# Patient Record
Sex: MALE | Race: BLACK OR AFRICAN AMERICAN | URBAN - METROPOLITAN AREA
[De-identification: names, ages, dates, MRNs, and addresses within clinical notes are randomized per-mention and may not be internally consistent; named-entity substitution may affect disease eponyms.]

---

## 2018-07-30 ENCOUNTER — OFFICE VISIT (OUTPATIENT)
Dept: URGENT CARE | Facility: URGENT CARE | Age: 33
End: 2018-07-30
Payer: OTHER MISCELLANEOUS

## 2018-07-30 ENCOUNTER — RADIANT APPOINTMENT (OUTPATIENT)
Dept: GENERAL RADIOLOGY | Facility: CLINIC | Age: 33
End: 2018-07-30
Attending: PHYSICIAN ASSISTANT
Payer: OTHER MISCELLANEOUS

## 2018-07-30 VITALS
TEMPERATURE: 98.7 F | HEART RATE: 66 BPM | WEIGHT: 210.5 LBS | DIASTOLIC BLOOD PRESSURE: 78 MMHG | SYSTOLIC BLOOD PRESSURE: 116 MMHG | OXYGEN SATURATION: 98 %

## 2018-07-30 DIAGNOSIS — S51.832A PUNCTURE WOUND OF LEFT FOREARM, INITIAL ENCOUNTER: Primary | ICD-10-CM

## 2018-07-30 PROBLEM — Z72.0 TOBACCO USE: Status: ACTIVE | Noted: 2018-06-16

## 2018-07-30 PROCEDURE — 99203 OFFICE O/P NEW LOW 30 MIN: CPT | Mod: 25 | Performed by: PHYSICIAN ASSISTANT

## 2018-07-30 PROCEDURE — 90715 TDAP VACCINE 7 YRS/> IM: CPT | Performed by: PHYSICIAN ASSISTANT

## 2018-07-30 PROCEDURE — 90471 IMMUNIZATION ADMIN: CPT | Performed by: PHYSICIAN ASSISTANT

## 2018-07-30 PROCEDURE — 73090 X-RAY EXAM OF FOREARM: CPT | Mod: LT

## 2018-07-30 ASSESSMENT — ENCOUNTER SYMPTOMS
MUSCULOSKELETAL NEGATIVE: 1
CARDIOVASCULAR NEGATIVE: 1
WEAKNESS: 0
COUGH: 0
CHEST TIGHTNESS: 0
HEMATURIA: 0
GASTROINTESTINAL NEGATIVE: 1
CHILLS: 0
WHEEZING: 0
RESPIRATORY NEGATIVE: 1
RHINORRHEA: 0
ENDOCRINE NEGATIVE: 1
EYE REDNESS: 0
FREQUENCY: 0
DIARRHEA: 0
CONSTITUTIONAL NEGATIVE: 1
NEUROLOGICAL NEGATIVE: 1
SORE THROAT: 0
EYES NEGATIVE: 1
LIGHT-HEADEDNESS: 0
MYALGIAS: 0
VOMITING: 0
POLYDIPSIA: 0
PALPITATIONS: 0
EYE DISCHARGE: 0
ADENOPATHY: 0
EYE ITCHING: 0
WOUND: 1
NAUSEA: 0
DIAPHORESIS: 0
HEADACHES: 0
ABDOMINAL PAIN: 0
DYSURIA: 0
FEVER: 0
DIZZINESS: 0
SHORTNESS OF BREATH: 0

## 2018-07-30 ASSESSMENT — PAIN SCALES - GENERAL: PAINLEVEL: NO PAIN (0)

## 2018-07-30 NOTE — MR AVS SNAPSHOT
"              After Visit Summary   2018    Temo Wilcox    MRN: [id number]           Patient Information     Date Of Birth          1985        Visit Information        Provider Department      2018 6:00 PM Karl Armstrong PA-C [facility]        Today's Diagnoses     Puncture wound of left forearm, initial encounter    -  1       Follow-ups after your visit        Who to contact     If you have questions or need follow up information about today's clinic visit or your schedule please contact [facility] directly at [phone].  Normal or non-critical lab and imaging results will be communicated to you by RatingBughart, letter or phone within 4 business days after the clinic has received the results. If you do not hear from us within 7 days, please contact the clinic through RatingBughart or phone. If you have a critical or abnormal lab result, we will notify you by phone as soon as possible.  Submit refill requests through BonzerDarg or call your pharmacy and they will forward the refill request to us. Please allow 3 business days for your refill to be completed.          Additional Information About Your Visit        MyChart Information     Bon[employer]Darg lets you send messages to your doctor, view your test results, renew your prescriptions, schedule appointments and more. To sign up, go to [email or web address] . Click on \"Log in\" on the left side of the screen, which will take you to the Welcome page. Then click on \"Sign up Now\" on the right side of the page.     You will be asked to enter the access code listed below, as well as some personal information. Please follow the directions to create your username and password.     Your access code is: [email or web address]  Expires: 10/28/2018  9:14 PM     Your access code will  in 90 days. If you need help or a new code, please call your [facility] or [phone].        Care EveryWhere ID     This is your Care " EveryWhere ID. This could be used by other organizations to access your [place] medical records  [id number]        Your Vitals Were     Pulse Temperature Pulse Oximetry             66 98.7  F (37.1  C) (Oral) 98%          Blood Pressure from Last 3 Encounters:   07/30/18 116/78    Weight from Last 3 Encounters:   07/30/18 210 lb 8 oz (95.5 kg)              We Performed the Following     TDAP, IM (10 - 64 YRS) - Adacel     XR Forearm Left 2 Views        Primary Care Provider Fax #    Physician No Ref-Primary [phone]       No address on file        Equal Access to Services     [facility] : Hadii aad ku hadasho Soomaali, waaxda luqadaha, qaybta kaalmada adejoseyajamel, henry calero . So [facility] [phone].    ATENCIÓN: Si habla español, tiene a powell disposición servicios gratuitos de asistencia lingüística. Llame al [phone].    We comply with applicable federal civil rights laws and [place] laws. We do not discriminate on the basis of race, color, national origin, age, disability, sex, sexual orientation, or gender identity.            Thank you!     Thank you for choosing [facility]  for your care. Our goal is always to provide you with excellent care. Hearing back from our patients is one way we can continue to improve our services. Please take a few minutes to complete the written survey that you may receive in the mail after your visit with us. Thank you!             Your Updated Medication List - Protect others around you: Learn how to safely use, store and throw away your medicines at www.disposemymeds.org.      Notice  As of 7/30/2018  9:14 PM    You have not been prescribed any medications.

## 2018-07-30 NOTE — LETTER
[facility]  [address]  [place] [address]  Phone: [phone]    July 30, 2018        Temo Wilcox  [address]  [place] [address]          To whom it may concern:    RE: Temo Wilcox    Patient was seen and treated today at our clinic and missed work.  Patient may return to work 7/31/2018 with the following:  No working or lifting restrictions    Please contact me for questions or concerns.      Sincerely,        Karl Armstrong PA-C

## 2018-07-31 NOTE — PROGRESS NOTES
Chief Complaint:     Chief Complaint   Patient presents with     Urgent Care     Laceration          HPI: Temo Wilcox is an 33 year old male that presents to clinic with a puncture wound to the L forearm.  Patient hit his arm on a drill bit while trying to move a piece of metal.  Patient is new to [place].  He denies numbness of the fingers. He denies dysfunction of the L wrist or hand. Patient denies any loss of strength to the L wrist or hand Patient is not up to date on tetanus.     Review of Systems:    Review of Systems   Constitutional: Negative.  Negative for chills, diaphoresis and fever.   HENT: Negative.  Negative for congestion, ear pain, rhinorrhea and sore throat.    Eyes: Negative.  Negative for discharge, redness and itching.   Respiratory: Negative.  Negative for cough, chest tightness, shortness of breath and wheezing.    Cardiovascular: Negative.  Negative for chest pain and palpitations.   Gastrointestinal: Negative.  Negative for abdominal pain, diarrhea, nausea and vomiting.   Endocrine: Negative.  Negative for polydipsia and polyuria.   Genitourinary: Negative for dysuria, frequency, hematuria and urgency.   Musculoskeletal: Negative.  Negative for myalgias.   Skin: Positive for wound. Negative for rash.   Allergic/Immunologic: Negative for immunocompromised state.   Neurological: Negative.  Negative for dizziness, weakness, light-headedness and headaches.   Hematological: Negative for adenopathy.       No pertinent family or medical Hx at this time.  No pertinent surgical Hx at this time.  Patient is a current every day smoker.     Family History   History reviewed. No pertinent family history.     Problem history  There is no problem list on file for this patient.       Allergies  No Known Allergies     Social History  Social History     Social History     Marital status: Single     Spouse name: N/A     Number of children: N/A     Years of education: N/A     Occupational History     Not  on file.     Social History Main Topics     Smoking status: Current Every Day Smoker     Smokeless tobacco: Never Used     Alcohol use Not on file     Drug use: Not on file     Sexual activity: Not on file     Other Topics Concern     Not on file     Social History Narrative     No narrative on file        Current Meds  No current outpatient prescriptions on file.       Vitals reviewed by Karl Armstrong  /78 (BP Location: Left arm, Patient Position: Chair, Cuff Size: Adult Large)  Pulse 66  Temp 98.7  F (37.1  C) (Oral)  Wt 210 lb 8 oz (95.5 kg)  SpO2 98%    Physical Exam:    Physical Exam   Constitutional: He appears well-developed and well-nourished. No distress.   HENT:   Head: Normocephalic and atraumatic.   Right Ear: Tympanic membrane and external ear normal.   Left Ear: Tympanic membrane and external ear normal.   Mouth/Throat: Oropharynx is clear and moist.   Eyes: EOM are normal. Pupils are equal, round, and reactive to light.   Neck: Normal range of motion. Neck supple.   Cardiovascular: Normal rate, regular rhythm, normal heart sounds and intact distal pulses.  Exam reveals no gallop and no friction rub.    No murmur heard.  Pulmonary/Chest: Effort normal and breath sounds normal. No respiratory distress.   Abdominal: Soft. Bowel sounds are normal. He exhibits no distension. There is no tenderness.   Lymphadenopathy:     He has no cervical adenopathy.   Neurological: He is alert. No cranial nerve deficit.   Skin: Skin is warm and dry. No rash noted. He is not diaphoretic.   Psychiatric: He has a normal mood and affect.   Nursing note and vitals reviewed.           Medical Decision Making:  Laceration no evidence of neurovascular injury. The wound will be closed using ***.     Assessment:          Plan:  Imaging of the injured area for foreign body or fracture {WAS / WAS NO:960694}  Indicated    Wound closed per procedure note below.    Wound was cleaned with sterile saline and surgiscrub.   "Antibiotic ointment and sterile dressing applied in clinic.     Discussed home wound care and need for follow up for {SUTURE/STAPLEREMOVAL:656679} removal in {NUMBERS(MULTIPLE):916542} days. Return to  with increased swelling, pain, redness, pus or fevers.    Patient was discharged in stable condition.  Patient verbalized understanding and agreed with this plan.      Procedure Note - Wound Repair:  Procedure performed by Parminder Armstrong.     Anesthesia was obtained with {Anesthesiaderm:823983} via {ANESTHESIA:466968}.  The wound, located on the ***, measured *** cm and was {LACERATION DESCRIPTION:5260::\"clean wound edges\",\"no foreign bodies\"}.  The level of complexity was: {SIMPLE COMPLEX:434380::\"simple\"} .  The neurovascular exam was ***.  It was cleaned with surgiscrub, irrigated with normal saline and explored.  The wound was closed using {SUTURE:193795} {suture technique:471260}.    Karl Armstrong 7:16 PM    "

## 2018-07-31 NOTE — NURSING NOTE
SUBJECTIVE:   Temo Wilcox is a 33 year old male who presents to clinic today for the following health issues:    Laceration      Duration: Today    Description (location/character/radiation): machine puncture left arm    Intensity:  moderate    Accompanying signs and symptoms: open wound    History (similar episodes/previous evaluation): None    Precipitating or alleviating factors: None    Therapies tried and outcome: None

## 2018-07-31 NOTE — PROGRESS NOTES
Chief Complaint:    Chief Complaint   Patient presents with     Urgent Care     Laceration       HPI: Temo Wilcox is an 33 year old male who presents for evaluation and treatment of puncture wound of L forearm.  Patient is new to [place].  Patient went to move a piece of metal at work and hit a drill bit with his forearm. This happened roughly 3 hours ago.  He has some mild pain in the forearm.  The pain is achy in nature and does not radiate anywhere.  He has not tried anything for the pain.  He denies any numbness or tingling in the arm.  He denies any weakness in the arm.  He does not know if there is any foreign body.  He is not up to date on his tetanus..      ROS:      Review of Systems   Constitutional: Negative.  Negative for chills, diaphoresis and fever.   HENT: Negative.  Negative for congestion, ear pain, rhinorrhea and sore throat.    Eyes: Negative.  Negative for discharge, redness and itching.   Respiratory: Negative.  Negative for cough, chest tightness, shortness of breath and wheezing.    Cardiovascular: Negative.  Negative for chest pain and palpitations.   Gastrointestinal: Negative.  Negative for abdominal pain, diarrhea, nausea and vomiting.   Endocrine: Negative.  Negative for polydipsia and polyuria.   Genitourinary: Negative for dysuria, frequency, hematuria and urgency.   Musculoskeletal: Negative.  Negative for myalgias.   Skin: Negative for rash.   Allergic/Immunologic: Negative for immunocompromised state.   Neurological: Negative.  Negative for dizziness, weakness, light-headedness and headaches.   Hematological: Negative for adenopathy.     No pertinent family, or medical Hx at this time.  No pertinent surgical Hx at this time.  Patient is a current every day smoker.     Family History   History reviewed. No pertinent family history.    Social History  Social History     Social History     Marital status: Single     Spouse name: N/A     Number of children: N/A     Years of  education: N/A     Occupational History     Not on file.     Social History Main Topics     Smoking status: Current Every Day Smoker     Smokeless tobacco: Never Used     Alcohol use Not on file     Drug use: Not on file     Sexual activity: Not on file     Other Topics Concern     Not on file     Social History Narrative     No narrative on file        Surgical History:  History reviewed. No pertinent surgical history.     Problem List:  Patient Active Problem List   Diagnosis     Tobacco use        Allergies:  No Known Allergies     Current Meds:  No current outpatient prescriptions on file.     PHYSICAL EXAM:     Vital signs noted and reviewed by Karl Armstrong  /78 (BP Location: Left arm, Patient Position: Chair, Cuff Size: Adult Large)  Pulse 66  Temp 98.7  F (37.1  C) (Oral)  Wt 210 lb 8 oz (95.5 kg)  SpO2 98%     PEFR:    Physical Exam   Constitutional: He appears well-developed and well-nourished. No distress.   HENT:   Head: Normocephalic and atraumatic.   Right Ear: Tympanic membrane and external ear normal.   Left Ear: Tympanic membrane and external ear normal.   Mouth/Throat: Oropharynx is clear and moist.   Eyes: EOM are normal. Pupils are equal, round, and reactive to light.   Neck: Normal range of motion. Neck supple.   Cardiovascular: Normal rate, regular rhythm, normal heart sounds and intact distal pulses.  Exam reveals no gallop and no friction rub.    No murmur heard.  Pulmonary/Chest: Effort normal and breath sounds normal. No respiratory distress.   Abdominal: Soft. Bowel sounds are normal. He exhibits no distension. There is no tenderness.   Musculoskeletal:        Left forearm: He exhibits no tenderness, no bony tenderness, no swelling, no edema and no deformity.        Arms:  2mm in diameter puncture wound.  No active bleeding.  No foreign body visible, or felt with palpation.  Full range of motion of wrist and digits.  Arm is neurologically intact.   Lymphadenopathy:     He has  no cervical adenopathy.   Neurological: He is alert. No cranial nerve deficit.   Skin: Skin is warm and dry. No rash noted. He is not diaphoretic.   Psychiatric: He has a normal mood and affect.   Nursing note and vitals reviewed.       Labs:     Results for orders placed or performed in visit on 07/30/18   XR Forearm Left 2 Views    Narrative    LEFT FOREARM TWO VIEWS     7/30/2018 7:48 PM      HISTORY: Puncture wound of left forearm.  Possible foreign body.   Puncture wound of left forearm, initial encounter.     COMPARISON: None.      Impression    IMPRESSION: No acute fracture or dislocation.       Medical Decision Making:    Differential Diagnosis:  MS Injury Pain: fracture and foreign body      ASSESSMENT:     1. Puncture wound of left forearm, initial encounter       PLAN:     Imaging discussed with patient.  XR was negative for any foreign body or fracture.  Band aid applied to puncture wound.  Patient given tetanus booster in clinic today.  Ice the area.  Ibuprofen for pain.  Ace wrap applied for comfort.  Discussed signs and symptoms of infection with instructions to return.  Worrisome symptoms discussed with instructions to go to the ED.  Patient verbalized understanding and agreed with this plan.     Karl Armstrong  7/30/2018, 8:31 PM